# Patient Record
Sex: MALE | Race: WHITE | URBAN - METROPOLITAN AREA
[De-identification: names, ages, dates, MRNs, and addresses within clinical notes are randomized per-mention and may not be internally consistent; named-entity substitution may affect disease eponyms.]

---

## 2023-01-17 ENCOUNTER — HOSPITAL ENCOUNTER (OUTPATIENT)
Dept: GENERAL RADIOLOGY | Age: 68
Discharge: HOME | End: 2023-01-17
Payer: MEDICARE

## 2023-01-17 ENCOUNTER — TRANSCRIBE ORDER (OUTPATIENT)
Dept: REGISTRATION | Age: 68
End: 2023-01-17

## 2023-01-17 DIAGNOSIS — M25.551 ARTHRALGIA OF HIP, RIGHT: ICD-10-CM

## 2023-01-17 DIAGNOSIS — Z77.090 ASBESTOS EXPOSURE: Primary | ICD-10-CM

## 2023-01-17 DIAGNOSIS — M25.561 PAIN IN RIGHT KNEE: ICD-10-CM

## 2023-01-17 DIAGNOSIS — Z77.090 ASBESTOS EXPOSURE: ICD-10-CM

## 2023-01-17 DIAGNOSIS — M25.551 ARTHRALGIA OF HIP, RIGHT: Primary | ICD-10-CM

## 2023-01-17 PROCEDURE — 73502 X-RAY EXAM HIP UNI 2-3 VIEWS: CPT

## 2023-01-17 PROCEDURE — 71046 X-RAY EXAM CHEST 2 VIEWS: CPT

## 2023-01-17 PROCEDURE — 73562 X-RAY EXAM OF KNEE 3: CPT

## 2023-01-18 ENCOUNTER — TRANSCRIBE ORDER (OUTPATIENT)
Dept: SCHEDULING | Age: 68
End: 2023-01-18

## 2023-01-18 DIAGNOSIS — Z77.090 HX OF ASBESTOS EXPOSURE: Primary | ICD-10-CM

## 2023-02-03 ENCOUNTER — TRANSCRIBE ORDER (OUTPATIENT)
Dept: SCHEDULING | Age: 68
End: 2023-02-03

## 2023-02-03 DIAGNOSIS — M16.11 PRIMARY OSTEOARTHRITIS OF RIGHT HIP: Primary | ICD-10-CM

## 2023-02-17 ENCOUNTER — HOSPITAL ENCOUNTER (OUTPATIENT)
Dept: GENERAL RADIOLOGY | Age: 68
Discharge: HOME | End: 2023-02-17
Attending: ORTHOPAEDIC SURGERY
Payer: MEDICARE

## 2023-02-17 DIAGNOSIS — M16.11 PRIMARY OSTEOARTHRITIS OF RIGHT HIP: ICD-10-CM

## 2023-02-17 PROCEDURE — 74011000250 HC RX REV CODE- 250: Performed by: RADIOLOGY

## 2023-02-17 PROCEDURE — 74011000636 HC RX REV CODE- 636: Performed by: RADIOLOGY

## 2023-02-17 PROCEDURE — 74011250636 HC RX REV CODE- 250/636: Performed by: RADIOLOGY

## 2023-02-17 PROCEDURE — 20610 DRAIN/INJ JOINT/BURSA W/O US: CPT

## 2023-02-17 RX ORDER — LIDOCAINE HYDROCHLORIDE 10 MG/ML
10 INJECTION, SOLUTION EPIDURAL; INFILTRATION; INTRACAUDAL; PERINEURAL
Status: COMPLETED | OUTPATIENT
Start: 2023-02-17 | End: 2023-02-17

## 2023-02-17 RX ORDER — BUPIVACAINE HYDROCHLORIDE 5 MG/ML
5 INJECTION, SOLUTION EPIDURAL; INTRACAUDAL
Status: COMPLETED | OUTPATIENT
Start: 2023-02-17 | End: 2023-02-17

## 2023-02-17 RX ORDER — TRIAMCINOLONE ACETONIDE 40 MG/ML
40 INJECTION, SUSPENSION INTRA-ARTICULAR; INTRAMUSCULAR
Status: COMPLETED | OUTPATIENT
Start: 2023-02-17 | End: 2023-02-17

## 2023-02-17 RX ADMIN — IOHEXOL 20 ML: 180 INJECTION INTRAVENOUS at 12:00

## 2023-02-17 RX ADMIN — BUPIVACAINE HYDROCHLORIDE 25 MG: 5 INJECTION, SOLUTION EPIDURAL; INTRACAUDAL; PERINEURAL at 12:00

## 2023-02-17 RX ADMIN — TRIAMCINOLONE ACETONIDE 40 MG: 40 INJECTION, SUSPENSION INTRA-ARTICULAR; INTRAMUSCULAR at 12:00

## 2023-02-17 RX ADMIN — LIDOCAINE HYDROCHLORIDE 10 ML: 10 INJECTION, SOLUTION EPIDURAL; INFILTRATION; INTRACAUDAL; PERINEURAL at 12:00

## 2023-03-10 ENCOUNTER — TRANSCRIBE ORDER (OUTPATIENT)
Dept: SCHEDULING | Age: 68
End: 2023-03-10

## 2023-03-10 DIAGNOSIS — Z86.010 HISTORY OF COLON POLYPS: ICD-10-CM

## 2023-03-10 DIAGNOSIS — R13.10 DYSPHAGIA: Primary | ICD-10-CM

## 2023-03-10 DIAGNOSIS — R06.6 HICCUPS: ICD-10-CM

## 2023-04-20 ENCOUNTER — HOSPITAL ENCOUNTER (OUTPATIENT)
Dept: GENERAL RADIOLOGY | Age: 68
Discharge: HOME | End: 2023-04-20
Attending: NURSE PRACTITIONER
Payer: MEDICARE

## 2023-04-20 DIAGNOSIS — Z86.010 HISTORY OF COLON POLYPS: ICD-10-CM

## 2023-04-20 DIAGNOSIS — R06.6 HICCUPS: ICD-10-CM

## 2023-04-20 DIAGNOSIS — R13.10 DYSPHAGIA: ICD-10-CM

## 2023-04-20 PROCEDURE — 74220 X-RAY XM ESOPHAGUS 1CNTRST: CPT

## 2023-04-23 DIAGNOSIS — Z86.010 HISTORY OF COLON POLYPS: ICD-10-CM

## 2023-04-23 DIAGNOSIS — R06.6 HICCUPS: Primary | ICD-10-CM

## 2023-06-02 ENCOUNTER — ANESTHESIA (OUTPATIENT)
Facility: HOSPITAL | Age: 68
End: 2023-06-02
Payer: MEDICARE

## 2023-06-02 ENCOUNTER — HOSPITAL ENCOUNTER (OUTPATIENT)
Facility: HOSPITAL | Age: 68
Setting detail: OUTPATIENT SURGERY
Discharge: HOME | End: 2023-06-02
Attending: INTERNAL MEDICINE | Admitting: INTERNAL MEDICINE
Payer: MEDICARE

## 2023-06-02 ENCOUNTER — ANESTHESIA EVENT (OUTPATIENT)
Facility: HOSPITAL | Age: 68
End: 2023-06-02
Payer: MEDICARE

## 2023-06-02 VITALS
BODY MASS INDEX: 31.46 KG/M2 | WEIGHT: 224.7 LBS | OXYGEN SATURATION: 96 % | HEART RATE: 51 BPM | DIASTOLIC BLOOD PRESSURE: 69 MMHG | HEIGHT: 71 IN | TEMPERATURE: 97.9 F | SYSTOLIC BLOOD PRESSURE: 133 MMHG | RESPIRATION RATE: 12 BRPM

## 2023-06-02 LAB
GLUCOSE BLD STRIP.AUTO-MCNC: 206 MG/DL (ref 65–117)
SERVICE CMNT-IMP: ABNORMAL

## 2023-06-02 PROCEDURE — 3700000000 HC ANESTHESIA ATTENDED CARE: Performed by: INTERNAL MEDICINE

## 2023-06-02 PROCEDURE — 3600007502: Performed by: INTERNAL MEDICINE

## 2023-06-02 PROCEDURE — 3600007512: Performed by: INTERNAL MEDICINE

## 2023-06-02 PROCEDURE — 7100000010 HC PHASE II RECOVERY - FIRST 15 MIN: Performed by: INTERNAL MEDICINE

## 2023-06-02 PROCEDURE — C1889 IMPLANT/INSERT DEVICE, NOC: HCPCS | Performed by: INTERNAL MEDICINE

## 2023-06-02 PROCEDURE — 6360000002 HC RX W HCPCS: Performed by: ANESTHESIOLOGY

## 2023-06-02 PROCEDURE — C1769 GUIDE WIRE: HCPCS | Performed by: INTERNAL MEDICINE

## 2023-06-02 PROCEDURE — 3700000001 HC ADD 15 MINUTES (ANESTHESIA): Performed by: INTERNAL MEDICINE

## 2023-06-02 PROCEDURE — 2709999900 HC NON-CHARGEABLE SUPPLY: Performed by: INTERNAL MEDICINE

## 2023-06-02 PROCEDURE — 2720000010 HC SURG SUPPLY STERILE: Performed by: INTERNAL MEDICINE

## 2023-06-02 PROCEDURE — 88305 TISSUE EXAM BY PATHOLOGIST: CPT

## 2023-06-02 PROCEDURE — 7100000011 HC PHASE II RECOVERY - ADDTL 15 MIN: Performed by: INTERNAL MEDICINE

## 2023-06-02 PROCEDURE — 82962 GLUCOSE BLOOD TEST: CPT

## 2023-06-02 PROCEDURE — 2580000003 HC RX 258: Performed by: INTERNAL MEDICINE

## 2023-06-02 PROCEDURE — 2500000003 HC RX 250 WO HCPCS: Performed by: ANESTHESIOLOGY

## 2023-06-02 RX ORDER — SODIUM CHLORIDE 9 MG/ML
25 INJECTION, SOLUTION INTRAVENOUS PRN
Status: DISCONTINUED | OUTPATIENT
Start: 2023-06-02 | End: 2023-06-02 | Stop reason: HOSPADM

## 2023-06-02 RX ORDER — ATORVASTATIN CALCIUM 80 MG/1
80 TABLET, FILM COATED ORAL DAILY
COMMUNITY

## 2023-06-02 RX ORDER — SODIUM CHLORIDE 0.9 % (FLUSH) 0.9 %
5-40 SYRINGE (ML) INJECTION PRN
Status: DISCONTINUED | OUTPATIENT
Start: 2023-06-02 | End: 2023-06-02 | Stop reason: HOSPADM

## 2023-06-02 RX ORDER — METOPROLOL TARTRATE 100 MG/1
100 TABLET ORAL 2 TIMES DAILY
COMMUNITY

## 2023-06-02 RX ORDER — SODIUM CHLORIDE 0.9 % (FLUSH) 0.9 %
5-40 SYRINGE (ML) INJECTION EVERY 12 HOURS SCHEDULED
Status: DISCONTINUED | OUTPATIENT
Start: 2023-06-02 | End: 2023-06-02 | Stop reason: HOSPADM

## 2023-06-02 RX ORDER — GLIPIZIDE 10 MG/1
10 TABLET, FILM COATED, EXTENDED RELEASE ORAL DAILY
COMMUNITY

## 2023-06-02 RX ORDER — AMLODIPINE BESYLATE 5 MG/1
5 TABLET ORAL DAILY
COMMUNITY

## 2023-06-02 RX ORDER — LISINOPRIL 40 MG/1
40 TABLET ORAL DAILY
COMMUNITY

## 2023-06-02 RX ORDER — ALLOPURINOL 300 MG/1
300 TABLET ORAL DAILY
COMMUNITY

## 2023-06-02 RX ORDER — LIDOCAINE HYDROCHLORIDE 20 MG/ML
INJECTION, SOLUTION EPIDURAL; INFILTRATION; INTRACAUDAL; PERINEURAL PRN
Status: DISCONTINUED | OUTPATIENT
Start: 2023-06-02 | End: 2023-06-02 | Stop reason: SDUPTHER

## 2023-06-02 RX ORDER — TAMSULOSIN HYDROCHLORIDE 0.4 MG/1
0.4 CAPSULE ORAL DAILY
COMMUNITY

## 2023-06-02 RX ADMIN — PROPOFOL 320 MG: 10 INJECTION, EMULSION INTRAVENOUS at 10:41

## 2023-06-02 RX ADMIN — LIDOCAINE HYDROCHLORIDE 100 MG: 20 INJECTION, SOLUTION EPIDURAL; INFILTRATION; INTRACAUDAL; PERINEURAL at 10:20

## 2023-06-02 RX ADMIN — SODIUM CHLORIDE 25 ML: 9 INJECTION, SOLUTION INTRAVENOUS at 10:03

## 2023-06-02 ASSESSMENT — PAIN - FUNCTIONAL ASSESSMENT: PAIN_FUNCTIONAL_ASSESSMENT: NONE - DENIES PAIN

## 2023-06-02 NOTE — OP NOTE
NAME:  Guero Collazo   :   1955   MRN:   [id number]     Date/Time:  2023 10:40 AM    Esophagogastroduodenoscopy (EGD) Procedure Note    Procedure: Esophagogastroduodenoscopy with biopsy, esophageal dilation    Indication: Dyphagia/odynophagia  Pre-operative Diagnosis: see indication above  Post-operative Diagnosis: see findings below  :  Rowena Hernandes MD  Referring Provider:   --Bea Winslow MD    Exam:  Airway: clear, no airway problems anticipated  Heart: RRR, without gallops or rubs  Lungs: clear bilaterally without wheezes, crackles, or rhonchi  Abdomen: soft, nontender, nondistended, bowel sounds present  Mental Status: awake, alert and oriented to person, place and time     Anethesia/Sedation:  MAC anesthesia Propofol  Procedure Details   After informed consent was obtained for the procedure, with all risks and benefits of procedure explained the patient was taken to the endoscopy suite and placed in the left lateral decubitus position. Following sequential administration of sedation as per above, the [id number] gastroscope was inserted into the mouth and advanced under direct vision to second portion of the duodenum. A careful inspection was made as the gastroscope was withdrawn, including a retroflexed view of the proximal stomach; findings and interventions are described below. Findings:   Normal proximal, mid, and distal esophagus. No strictures noted as seen on prior Barium swallow. Biopsies taken of mid esophagus to evaluate for Eosinophilic esophagitis. Given dysphagia and findings on Barium swallow, empiric 51 Fr Savary dilation performed  Mild, patchy, non-erosive antral gastropathy. Biopsied  Stomach otherwise normal, including retroflexion  Normal duodenal bulb and 2nd portion of the duodenum    Therapies:  1. Biopsies 2. 51 Fr Savary dilation    Specimens: 1. Gastric 2. Mid esophagus    EBL:  None.          Complications:   None; patient tolerated the

## 2023-06-02 NOTE — ANESTHESIA POSTPROCEDURE EVALUATION
Department of Anesthesiology  Postprocedure Note    Patient:  Jb Wolff  MRN: [id number]  YOB: 1955  Date of evaluation: 6/2/2023      Procedure Summary     Date: 06/02/23 Room / Location: [facility] ENDO 03 / [facility] END[facility]COPY    Anesthesia Start: 0196 Anesthesia Stop: 7625    Procedures:       COLONOSCOPY WITH BIOPSY AND POLYPECTOMY, EGD WITH BIOPSY AND DILATION (Lower GI Region)      EGD BIOPSY (Upper GI Region) Diagnosis:       Dysphagia, unspecified type      Hiccups      Hx of colonic polyps      (Dysphagia, unspecified type [R13.10])      (Hiccups [R06.6])      (Hx of colonic polyps [Z86.010])    Surgeons: Hunter Rod MD Responsible Provider: Yuridia Ingram DO    Anesthesia Type: MAC ASA Status: 3          Anesthesia Type: MAC    Kavitha Phase I: Kavitha Score: 9    Kavitha Phase II: Kavitha Score: 10      Anesthesia Post Evaluation    Patient location during evaluation: bedside  Patient participation: complete - patient participated  Level of consciousness: awake and alert  Pain score: 0  Airway patency: patent  Nausea & Vomiting: no nausea  Complications: no  Cardiovascular status: blood pressure returned to baseline  Respiratory status: acceptable and room air  Hydration status: euvolemic

## 2023-06-02 NOTE — OP NOTE
NAME:  Felicitas Dukes   :   1955   MRN:   [id number]     Date/Time:  2023 10:45 AM    Colonoscopy Operative Report    Procedure Type:   Colonoscopy --screening     Indications:     Personal history of colon polyps (screening only)  Pre-operative Diagnosis: see indication above  Post-operative Diagnosis:  See findings below  :  Catherine Thao MD  Referring Provider: --Maria Elena Catalan MD    Exam:  Airway: clear, no airway problems anticipated  Heart: RRR, without gallops or rubs  Lungs: clear bilaterally without wheezes, crackles, or rhonchi  Abdomen: soft, nontender, nondistended, bowel sounds present  Mental Status: awake, alert and oriented to person, place and time    Sedation:  MAC anesthesia Propofol  Procedure Details:  After informed consent was obtained with all risks and benefits of procedure explained and preoperative exam completed, the patient was taken to the endoscopy suite and placed in the left lateral decubitus position. Upon sequential sedation as per above, a digital rectal exam was performed demonstrating internal hemorrhoids. The Olympus videocolonoscope  was inserted in the rectum and carefully advanced to the cecum, which was identified by the ileocecal valve and appendiceal orifice. The quality of preparation was good. The colonoscope was slowly withdrawn with careful evaluation between folds. Retroflexion in the rectum was completed demonstrating internal hemorrhoids. Findings:   Large tattoo seen in sigmoid colon, 20 cm proximal from anal verge. This was closely examined and there was no evidence of residual/recurrent adenoma  Moderate left-sided diverticulosis  Medium sized internal hemorrhoids seen on retroflexion  Otherwise normal colonoscopy through to the cecum    Specimen Removed:  None  Complications: None. EBL:  None. Impression:    Large tattoo seen in sigmoid colon, 20 cm proximal from anal verge.  This was closely examined and there was no evidence

## 2023-06-02 NOTE — PROGRESS NOTES
Endoscopy Case End Note:    1025:  Procedure scope (EGD) was pre-cleaned, per protocol, at bedside by Phan Zee, [id number] [facility]:  Procedure scope (colon) was pre-cleaned, per protocol, at bedside by Phan Zee ET.      1045:  Report received from anesthesia - Dr. Alyx Gibson. See anesthesia flowsheet for intra-procedure vital signs and events. 1045:  glasses returned to patient.

## 2023-06-02 NOTE — H&P
Gastroenterology Outpatient History and Physical    Patient: Kunal Rivero    Physician: Elvin Dooley MD    Chief Complaint: Dysphagia  History of Present Illness: H/o colon polyps    History:  Past Medical History:   Diagnosis Date    Diabetes mellitus ([address]     Hyperlipidemia     Hypertension     Osteoarthritis       Past Surgical History:   Procedure Laterality Date    ORTHOPEDIC SURGERY Left     reconstructive surgery - no metal      Social History     Socioeconomic History    Marital status: Unknown     Spouse name: None    Number of children: None    Years of education: None    Highest education level: None   Tobacco Use    Smoking status: Never    Smokeless tobacco: Never   Substance and Sexual Activity    Alcohol use: Not Currently    Drug use: Never    History reviewed. No pertinent family history. There is no problem list on file for this patient. Allergies: No Known Allergies  Medications:   Prior to Admission medications    Medication Sig Start Date End Date Taking?  Authorizing Provider   metFORMIN (GLUCOPHAGE) 1000 MG tablet Take 1 tablet by mouth 2 times daily (with meals)   Yes Historical Provider, MD   glipiZIDE (GLUCOTROL XL) 10 MG extended release tablet Take 1 tablet by mouth daily   Yes Historical Provider, MD   metoprolol (LOPRESSOR) 100 MG tablet Take 1 tablet by mouth 2 times daily   Yes Historical Provider, MD   amLODIPine (NORVASC) 5 MG tablet Take 1 tablet by mouth daily   Yes Historical Provider, MD   lisinopril (PRINIVIL;ZESTRIL) 40 MG tablet Take 1 tablet by mouth daily   Yes Historical Provider, MD   allopurinol (ZYLOPRIM) 300 MG tablet Take 1 tablet by mouth daily   Yes Historical Provider, MD   atorvastatin (LIPITOR) 80 MG tablet Take 1 tablet by mouth daily   Yes Historical Provider, MD   tamsulosin (FLOMAX) 0.4 MG capsule Take 1 capsule by mouth daily   Yes Historical Provider, MD   Insulin NPH Isophane & Regular (NOVOLIN 70/30 FLEXPEN SC) Inject into the skin in the morning

## 2023-06-02 NOTE — PROGRESS NOTES
ARRIVAL INFORMATION:  Verified patient name and date of birth, scheduled procedure, and informed consent. : Quinten Kanner (spouse) contact number: [phone]  Physician and staff can share information with the . Belongings with patient include:  [place] - wedding band, necklace    GI FOCUSED ASSESSMENT:  Neuro: Awake, alert, oriented x4  Respiratory: even and unlabored   GI: soft and non-distended  EKG Rhythm: sinus bradycardia    Education:Reviewed general discharge instructions and  information.

## 2023-06-02 NOTE — DISCHARGE INSTRUCTIONS
Ingrid Parson  [id number]  1955    COLON DISCHARGE INSTRUCTIONS  Discomfort:  Redness at IV site- apply warm compress to area; if redness or soreness persist- contact your physician  There may be a slight amount of blood passed from the rectum  Gaseous discomfort- walking, belching will help relieve any discomfort  You may not operate a vehicle for 12 hours  You may not engage in an occupation involving machinery or appliances for rest of today  You may not drink alcoholic beverages for at least 12 hours  Avoid making any critical decisions for at least 24 hour  DIET:   Regular diet. - however -  remember your colon is empty and a heavy meal will produce gas. Avoid these foods:  vegetables, fried / greasy foods, carbonated drinks for today  MEDICATION:  [unfilled]     ACTIVITY:  You may not resume your normal daily activities until tomorrow AM; it is recommended that you spend the remainder of the day resting -  avoid any strenuous activity. CALL M.D. ANY SIGN OF:   Increasing pain, nausea, vomiting  Abdominal distension (swelling)  New increased bleeding (oral or rectal)  Fever (chills)  Pain in chest area  Bloody discharge from nose or mouth  Shortness of breath    You may not  take any Advil, Aspirin, Ibuprofen, Motrin, Aleve, or Goodys for 10 days, ONLY  Tylenol as needed for pain. IMPRESSION:  EGD:  Impression:    Normal proximal, mid, and distal esophagus. No strictures noted as seen on prior Barium swallow. Biopsies taken of mid esophagus to evaluate for Eosinophilic esophagitis. Given dysphagia and findings on Barium swallow, empiric 51 Fr Savary dilation performed  Mild, patchy, non-erosive antral gastropathy. Biopsied  Stomach otherwise normal, including retroflexion  Normal duodenal bulb and 2nd portion of the duodenum     Recommendations: Follow up pathology    Colon:  Impression:    Large tattoo seen in sigmoid colon, 20 cm proximal from anal verge.  This was closely examined and there

## 2023-06-02 NOTE — ANESTHESIA PRE PROCEDURE
Department of Anesthesiology  Preprocedure Note       Name:  Claudia Carmona   Age:  74 y.o.  :  1955                                          MRN:  [id number]         Date:  2023      Surgeon: Moses Romero):  [place] Adrian MD    Procedure: Procedure(s):  COLONOSCOPY WITH BIOPSY AND POLYPECTOMY, EGD WITH BIOPSY AND DILATION  EGD BIOPSY    Medications prior to admission:   Prior to Admission medications    Not on File       Current medications:    Current Facility-Administered Medications   Medication Dose Route Frequency Provider Last Rate Last Admin    sodium chloride flush 0.9 % injection 5-40 mL  5-40 mL IntraVENous 2 times per day Cuba Adrian MD        sodium chloride flush 0.9 % injection 5-40 mL  5-40 mL IntraVENous PRN Cuba Adrian MD        0.9 % sodium chloride infusion  25 mL IntraVENous PRN [place] Adrian MD           Allergies:  No Known Allergies    Problem List:  There is no problem list on file for this patient. Past Medical History:  No past medical history on file. Past Surgical History:  No past surgical history on file.     Social History:    Social History     Tobacco Use    Smoking status: Not on file    Smokeless tobacco: Not on file   Substance Use Topics    Alcohol use: Not on file                                Counseling given: Not Answered      Vital Signs (Current):   Vitals:    23 0906   Weight: 73.9 kg (163 lb)                                              BP Readings from Last 3 Encounters:   No data found for BP       NPO Status:                                                                                 BMI:   Wt Readings from Last 3 Encounters:   23 73.9 kg (163 lb)     There is no height or weight on file to calculate BMI.    CBC: No results found for: WBC, RBC, HGB, HCT, MCV, RDW, PLT    CMP: No results found for: NA, K, CL, CO2, BUN, CREATININE, GFRAA, AGRATIO, LABGLOM, GLUCOSE, GLU, PROT, CALCIUM, BILITOT, ALKPHOS, AST,

## 2025-04-17 ENCOUNTER — TRANSCRIBE ORDERS (OUTPATIENT)
Facility: HOSPITAL | Age: 70
End: 2025-04-17

## 2025-04-17 DIAGNOSIS — R19.7 DIARRHEA, UNSPECIFIED TYPE: ICD-10-CM

## 2025-04-17 DIAGNOSIS — R11.2 NAUSEA AND VOMITING, UNSPECIFIED VOMITING TYPE: Primary | ICD-10-CM

## 2025-04-17 DIAGNOSIS — R13.10 DYSPHAGIA, UNSPECIFIED TYPE: ICD-10-CM

## 2025-07-16 ENCOUNTER — TRANSCRIBE ORDERS (OUTPATIENT)
Facility: HOSPITAL | Age: 70
End: 2025-07-16

## 2025-07-16 DIAGNOSIS — R29.6 FREQUENT FALLS: ICD-10-CM

## 2025-07-16 DIAGNOSIS — R53.1 RIGHT SIDED WEAKNESS: Primary | ICD-10-CM

## 2025-07-16 DIAGNOSIS — R41.3 MEMORY LOSS: ICD-10-CM

## 2025-07-21 ENCOUNTER — HOSPITAL ENCOUNTER (OUTPATIENT)
Facility: HOSPITAL | Age: 70
Discharge: HOME | End: 2025-07-24
Payer: MEDICARE

## 2025-07-21 DIAGNOSIS — R29.6 FREQUENT FALLS: ICD-10-CM

## 2025-07-21 DIAGNOSIS — R53.1 RIGHT SIDED WEAKNESS: ICD-10-CM

## 2025-07-21 DIAGNOSIS — R41.3 MEMORY LOSS: ICD-10-CM

## 2025-07-21 PROCEDURE — 70450 CT HEAD/BRAIN W/O DYE: CPT

## (undated) DEVICE — BITEBLOCK 54FR W/ DENT RIM BLOX

## (undated) DEVICE — TRAP ENDOSCP POLYP 2 CHMBR DRAWER TYP

## (undated) DEVICE — CATHETER IV 20GA L1.16IN OD1.0414-1.1176MM ID0.762-0.8382MM

## (undated) DEVICE — CUFF BLD PRSS AD CLTH SGL TB W/ BAYNT CONN ROUNDED CORNER

## (undated) DEVICE — FIAPC® PROBE W/ FILTER 2200 C OD 2.3MM/6.9FR; L 2.2M/7.2FT: Brand: ERBE

## (undated) DEVICE — ENDOSCOPIC KIT COMPLIANCE ENDOKIT

## (undated) DEVICE — INJECTION THERAPY NEEDLE CATHETER: Brand: INTERJECT

## (undated) DEVICE — CLIP LIG L235CM RESOL 360 BX/20

## (undated) DEVICE — SINGLE-USE BIOPSY FORCEPS: Brand: RADIAL JAW 4

## (undated) DEVICE — ELECTRODE PT RET AD L9FT HI MOIST COND ADH HYDRGEL CORDED

## (undated) DEVICE — CONTAINER SPEC 20 ML LID NEUT BUFF FORMALIN 10 % POLYPR STS

## (undated) DEVICE — REUSABLE MARKED SPRING TIP GUIDEWIRE, 210 CM X 1.86 MM: Brand: CONMED

## (undated) DEVICE — SYSTEM REPROC CBL 3 LD DISPOSABLE

## (undated) DEVICE — IV START KIT: Brand: MEDLINE

## (undated) DEVICE — TIP SUCT TRNSPAR RIB SURF STD BLB RIG NVENT W/ 5IN1 CONN DYND50138] MEDLINE INDUSTRIES INC]

## (undated) DEVICE — FIAPC® PROBE W/ FILTER 2200 A OD 2.3MM/6.9FR; L 2.2M/7.2FT: Brand: ERBE

## (undated) DEVICE — SNARE ENDOSCP POLYP MED STD AD 2.4X27X240 CM 2.8 MM OVL SENS

## (undated) DEVICE — Device